# Patient Record
Sex: FEMALE | Race: BLACK OR AFRICAN AMERICAN | NOT HISPANIC OR LATINO | Employment: FULL TIME | URBAN - METROPOLITAN AREA
[De-identification: names, ages, dates, MRNs, and addresses within clinical notes are randomized per-mention and may not be internally consistent; named-entity substitution may affect disease eponyms.]

---

## 2017-02-07 ENCOUNTER — ALLSCRIPTS OFFICE VISIT (OUTPATIENT)
Dept: OTHER | Facility: OTHER | Age: 52
End: 2017-02-07

## 2017-02-07 DIAGNOSIS — R10.11 RIGHT UPPER QUADRANT PAIN: ICD-10-CM

## 2017-02-07 DIAGNOSIS — Z98.84 BARIATRIC SURGERY STATUS: ICD-10-CM

## 2017-02-08 ENCOUNTER — GENERIC CONVERSION - ENCOUNTER (OUTPATIENT)
Dept: OTHER | Facility: OTHER | Age: 52
End: 2017-02-08

## 2017-02-14 ENCOUNTER — HOSPITAL ENCOUNTER (OUTPATIENT)
Dept: ULTRASOUND IMAGING | Facility: HOSPITAL | Age: 52
Discharge: HOME | End: 2017-02-14
Payer: COMMERCIAL

## 2017-02-14 DIAGNOSIS — R10.11 RIGHT UPPER QUADRANT PAIN: ICD-10-CM

## 2017-02-14 DIAGNOSIS — Z98.84 BARIATRIC SURGERY STATUS: ICD-10-CM

## 2017-02-14 PROCEDURE — 76705 ECHO EXAM OF ABDOMEN: CPT

## 2017-02-24 ENCOUNTER — GENERIC CONVERSION - ENCOUNTER (OUTPATIENT)
Dept: OTHER | Facility: OTHER | Age: 52
End: 2017-02-24

## 2017-03-03 ENCOUNTER — GENERIC CONVERSION - ENCOUNTER (OUTPATIENT)
Dept: OTHER | Facility: OTHER | Age: 52
End: 2017-03-03

## 2017-03-06 ENCOUNTER — GENERIC CONVERSION - ENCOUNTER (OUTPATIENT)
Dept: OTHER | Facility: OTHER | Age: 52
End: 2017-03-06

## 2018-01-10 NOTE — RESULT NOTES
Message   let patient know ultrasound of gallbladder is normal   If she is still having abdominal pain, she should make follow-up visit- continue omeprazole for now  CR     Verified Results  US ABDOMEN LIMITED [id number] 10:06AM Twila Mock Order Number: [id number]    - Patient Instructions: To schedule this appointment, please contact Central Scheduling at [phone]  Test Name Result Flag Reference   US ABDOMEN LIMITED (Report)     RIGHT UPPER QUADRANT ULTRASOUND     INDICATION: Intermittent right upper quadrant pain  History of gastric bypass  COMPARISON: CT 3/17/2014     TECHNIQUE:  Real-time ultrasound of the right upper quadrant was performed with a curvilinear transducer with both volumetric sweeps and still imaging techniques  FINDINGS:     PANCREAS: Visualized portions of the pancreas are within normal limits  AORTA AND IVC: Visualized portions are normal for patient age  LIVER:   Size: Within normal range  The liver measures 14 5 cm in the midclavicular line  Contour: Surface contour is smooth  Parenchyma: Echogenicity and echotexture are within normal limits  No evidence of suspicious mass  Limited imaging of the main portal vein shows it to be patent and hepatopedal       BILIARY:   The gallbladder is normal in caliber  No wall thickening or pericholecystic fluid  No stones or sludge identified  No sonographic Page's sign  No intrahepatic biliary dilatation  CBD measures 4 5 mm  No choledocholithiasis  KIDNEY:    Right kidney measures 10 3 x 4 7 cm with cortical thickness of 1 8 cm  Within normal limits  ASCITES:  None         IMPRESSION:     Normal        Workstation performed: [id number]     Signed by:   Yousuf Negrete MD   2/14/17       Plan  RUQ abdominal pain, Status post gastric bypass for obesity    · Omeprazole 20 MG Oral Capsule Delayed Release; take 1 capsule daily

## 2018-01-10 NOTE — PROGRESS NOTES
Message  2nd attempt to reach patient  Left message for patient to call if interested in meeting with DAYNA and MAC VAUGHN      Active Problems    1  Benign diastolic hypertension (415 6) (I10)   2  Constipation (564 00) (K59 00)   3  Hypertension (401 9) (I10)   4  Obesity (278 00) (E66 9)   5  Osteoarthritis (715 90) (M19 90)   6  Postgastrectomy malabsorption (579 3) (K91 2,Z90 3)   7  RUQ abdominal pain (789 01) (R10 11)   8  Status post gastric bypass for obesity (V45 86) (Z98 84)   9  Type 2 diabetes mellitus with hyperglycemia (250 00) (E11 65)   10  Weight gain following gastric bypass surgery (783 1) (R63 5)    Current Meds   1  Calcium Citrate TABS; Therapy: (Recorded:66Pxw0062) to Recorded   2  Meloxicam 15 MG Oral Tablet; Therapy: () to Recorded   3  MetFORMIN HCl - 500 MG Oral Tablet; Therapy: (Recorded:07Jun2013) to Recorded   4  Multi-Vitamin TABS; twice daily; Therapy: (Recorded:84Crf4055) to Recorded   5  Quinapril HCl - 40 MG Oral Tablet; Therapy: () to Recorded   6  Spironolactone 25 MG Oral Tablet; Therapy: (Recorded:07Apr2016) to Recorded    Allergies    1   No Known Drug Allergies    Signatures   Electronically signed by : AMIRA Varma; Feb 24 2017  1:01PM EST                       (Author)

## 2018-01-11 NOTE — PROGRESS NOTES
Message  Outreach phone call to schedule appointment; no response but left message for a return phone call  [place]      Active Problems    1  Benign diastolic hypertension (147 8) (I10)   2  Constipation (564 00) (K59 00)   3  Hypertension (401 9) (I10)   4  Obesity (278 00) (E66 9)   5  Osteoarthritis (715 90) (M19 90)   6  Postgastrectomy malabsorption (579 3) (K91 2,Z90 3)   7  RUQ abdominal pain (789 01) (R10 11)   8  Status post gastric bypass for obesity (V45 86) (Z98 84)   9  Type 2 diabetes mellitus with hyperglycemia (250 00) (E11 65)   10  Weight gain following gastric bypass surgery (783 1) (R63 5)    Current Meds   1  Calcium Citrate TABS; Therapy: (Recorded:11Got4992) to Recorded   2  Meloxicam 15 MG Oral Tablet; Therapy: (96 86 26) to Recorded   3  MetFORMIN HCl - 500 MG Oral Tablet; Therapy: (Recorded:07Jun2013) to Recorded   4  Multi-Vitamin TABS; twice daily; Therapy: (Recorded:62Fsu0822) to Recorded   5  Quinapril HCl - 40 MG Oral Tablet; Therapy: (96 86 26) to Recorded   6  Spironolactone 25 MG Oral Tablet; Therapy: (Recorded:07Apr2016) to Recorded    Allergies    1   No Known Drug Allergies    Signatures   Electronically signed by : AMIRA Mederos; Feb 8 2017  3:17PM EST                       (Author)

## 2018-01-12 VITALS
WEIGHT: 215.5 LBS | HEART RATE: 70 BPM | SYSTOLIC BLOOD PRESSURE: 124 MMHG | DIASTOLIC BLOOD PRESSURE: 80 MMHG | RESPIRATION RATE: 22 BRPM | BODY MASS INDEX: 36.79 KG/M2 | TEMPERATURE: 97.5 F | HEIGHT: 64 IN

## 2018-01-13 NOTE — MISCELLANEOUS
Message  Called patient per Pamela's note that ultrasound was normal  She states no pain, so no follow up appt needs to be made  And I stated for her to continue her Omeprazole and to call if anything changes for her  She understood  Active Problems    1  Benign diastolic hypertension (743 7) (I10)   2  Constipation (564 00) (K59 00)   3  Hypertension (401 9) (I10)   4  Obesity (278 00) (E66 9)   5  Osteoarthritis (715 90) (M19 90)   6  Postgastrectomy malabsorption (579 3) (K91 2,Z90 3)   7  RUQ abdominal pain (789 01) (R10 11)   8  Status post gastric bypass for obesity (V45 86) (Z98 84)   9  Type 2 diabetes mellitus with hyperglycemia (250 00) (E11 65)   10  Weight gain following gastric bypass surgery (783 1) (R63 5)    Current Meds   1  Calcium Citrate TABS; Therapy: (Recorded:11Abd6840) to Recorded   2  Meloxicam 15 MG Oral Tablet; Therapy: () to Recorded   3  MetFORMIN HCl - 500 MG Oral Tablet; Therapy: (Recorded:07Jun2013) to Recorded   4  Multi-Vitamin TABS; twice daily; Therapy: (Recorded:66Dau0450) to Recorded   5  Omeprazole 20 MG Oral Capsule Delayed Release; take 1 capsule daily; Therapy: 43JRW5255 to (Last Rx:03Mar2017)  Requested for: 55GSZ3911 Ordered   6  Quinapril HCl - 40 MG Oral Tablet; Therapy: () to Recorded   7  Spironolactone 25 MG Oral Tablet; Therapy: (Recorded:07Apr2016) to Recorded    Allergies    1   No Known Drug Allergies    Signatures   Electronically signed by : Tremayne Blanton, ; Mar  6 2017  8:56AM EST                       (Author)

## 2018-01-13 NOTE — MISCELLANEOUS
Provider Comments  Provider Comments:   Dear Jae Jeffries,     We called you about your scheduled appointment for today but were unable to reach you  It is very important that you follow up with us so that we can assess your physical and nutritional safety  Please call our office at [phone] to reschedule your appointment       Sincerely,     [facility]        Signatures   Electronically signed by : Mayra Durant, ; Jul 7 2016 10:35AM EST                       (Author)    Electronically signed by : AMBERLY Irwin ; Jul 7 2016  3:23PM EST                       (Co-author)

## 2018-01-15 NOTE — PROGRESS NOTES
Message  No show nor did she call to reschedule  NV      Active Problems    1  Benign diastolic hypertension (461 7) (I10)   2  Constipation (564 00) (K59 00)   3  Hypertension (401 9) (I10)   4  Obesity (278 00) (E66 9)   5  Osteoarthritis (715 90) (M19 90)   6  Postgastrectomy malabsorption (579 3) (K91 2,Z90 3)   7  Status post gastric bypass for obesity (V45 86) (Z98 84)   8  Type 2 diabetes mellitus with hyperglycemia (250 00) (E11 65)   9  Weight gain following gastric bypass surgery (783 1) (R63 5)    Current Meds   1  Calcium Citrate TABS; Therapy: (Recorded:79Vae4232) to Recorded   2  Meloxicam 15 MG Oral Tablet; Therapy: () to Recorded   3  MetFORMIN HCl - 500 MG Oral Tablet; Therapy: (Recorded:07Jun2013) to Recorded   4  Multi-Vitamin TABS; twice daily; Therapy: (Recorded:68Fdb2683) to Recorded   5  Quinapril HCl - 40 MG Oral Tablet; Therapy: () to Recorded   6  Spironolactone 25 MG Oral Tablet; Therapy: (Recorded:07Apr2016) to Recorded    Allergies    1   No Known Drug Allergies    Signatures   Electronically signed by : AMIRA Bryan; Jun 16 2016 11:54AM EST                       (Author)

## 2018-01-16 NOTE — PROGRESS NOTES
Message  Outreach phone call to schedule appointment with patient regarding weight regain  Left message on voice mail for a return phone call  NV       Active Problems    1  Benign diastolic hypertension (501 9) (I10)   2  Constipation (564 00) (K59 00)   3  Hypertension (401 9) (I10)   4  Obesity (278 00) (E66 9)   5  Osteoarthritis (715 90) (M19 90)   6  Postgastrectomy malabsorption (579 3) (K91 2)   7  Status post gastric bypass for obesity (V45 86) (Z98 84)   8  Type 2 diabetes mellitus with hyperglycemia (250 00) (E11 65)    Current Meds   1  Calcium Citrate TABS; Therapy: (Recorded:00Zcc8952) to Recorded   2  Meloxicam 15 MG Oral Tablet; Therapy: (0487 23 46 71) to Recorded   3  MetFORMIN HCl - 500 MG Oral Tablet; Therapy: (Recorded:35Ehn5085) to Recorded   4  Multi-Vitamin TABS; twice daily; Therapy: (Recorded:59Vmn4731) to Recorded   5  Quinapril HCl - 40 MG Oral Tablet; Therapy: (Recorded:88Ryn5616) to Recorded    Allergies    1   No Known Drug Allergies    Signatures   Electronically signed by : AMIRA Polanco; Feb 4 2016 11:44AM EST                       (Author)

## 2019-02-11 ENCOUNTER — TELEPHONE (OUTPATIENT)
Dept: BARIATRICS | Facility: CLINIC | Age: 54
End: 2019-02-11